# Patient Record
Sex: FEMALE | Race: WHITE | NOT HISPANIC OR LATINO
[De-identification: names, ages, dates, MRNs, and addresses within clinical notes are randomized per-mention and may not be internally consistent; named-entity substitution may affect disease eponyms.]

---

## 2018-11-28 ENCOUNTER — CHARTING TRANS (OUTPATIENT)
Dept: OTHER | Age: 63
End: 2018-11-28

## 2019-02-06 VITALS
DIASTOLIC BLOOD PRESSURE: 96 MMHG | HEART RATE: 104 BPM | RESPIRATION RATE: 18 BRPM | SYSTOLIC BLOOD PRESSURE: 120 MMHG | BODY MASS INDEX: 27.99 KG/M2 | WEIGHT: 152.12 LBS | HEIGHT: 62 IN | TEMPERATURE: 98.7 F

## 2019-09-18 ENCOUNTER — OFFICE VISIT (OUTPATIENT)
Dept: FAMILY MEDICINE CLINIC | Facility: CLINIC | Age: 64
End: 2019-09-18
Payer: MEDICARE

## 2019-09-18 ENCOUNTER — APPOINTMENT (OUTPATIENT)
Dept: LAB | Age: 64
End: 2019-09-18
Attending: FAMILY MEDICINE
Payer: MEDICARE

## 2019-09-18 VITALS
DIASTOLIC BLOOD PRESSURE: 89 MMHG | SYSTOLIC BLOOD PRESSURE: 154 MMHG | TEMPERATURE: 98 F | BODY MASS INDEX: 28.52 KG/M2 | HEART RATE: 91 BPM | HEIGHT: 61.81 IN | RESPIRATION RATE: 16 BRPM | WEIGHT: 155 LBS

## 2019-09-18 DIAGNOSIS — Z12.31 ENCOUNTER FOR SCREENING MAMMOGRAM FOR HIGH-RISK PATIENT: ICD-10-CM

## 2019-09-18 DIAGNOSIS — R03.0 ELEVATED BP WITHOUT DIAGNOSIS OF HYPERTENSION: ICD-10-CM

## 2019-09-18 DIAGNOSIS — Z00.00 ROUTINE PHYSICAL EXAMINATION: ICD-10-CM

## 2019-09-18 DIAGNOSIS — M25.50 ARTHRALGIA, UNSPECIFIED JOINT: Primary | ICD-10-CM

## 2019-09-18 DIAGNOSIS — R73.01 ELEVATED FASTING GLUCOSE: ICD-10-CM

## 2019-09-18 DIAGNOSIS — Z86.69 HISTORY OF MACULAR DEGENERATION: ICD-10-CM

## 2019-09-18 PROCEDURE — 99203 OFFICE O/P NEW LOW 30 MIN: CPT | Performed by: FAMILY MEDICINE

## 2019-09-18 NOTE — PROGRESS NOTES
HPI: Kale Fung is a 71year old female who presents for establishment of care. Has not been seen in 4-5 years. Has not seen another doctor. Pt reports she gets cough every once in awhile. Very active. She does have joint pain at the end of the day. Retired.

## 2019-09-24 DIAGNOSIS — E78.5 HYPERLIPIDEMIA, UNSPECIFIED HYPERLIPIDEMIA TYPE: Primary | ICD-10-CM

## 2019-09-30 ENCOUNTER — HOSPITAL ENCOUNTER (OUTPATIENT)
Dept: MAMMOGRAPHY | Age: 64
Discharge: HOME | End: 2019-09-30
Attending: FAMILY MEDICINE
Payer: MEDICARE

## 2019-09-30 DIAGNOSIS — Z12.31 ENCOUNTER FOR SCREENING MAMMOGRAM FOR HIGH-RISK PATIENT: ICD-10-CM

## 2019-09-30 PROCEDURE — 77067 SCR MAMMO BI INCL CAD: CPT | Performed by: FAMILY MEDICINE

## 2019-09-30 PROCEDURE — 77063 BREAST TOMOSYNTHESIS BI: CPT | Performed by: FAMILY MEDICINE

## 2019-10-01 DIAGNOSIS — R92.2 DENSE BREAST TISSUE ON MAMMOGRAM: Primary | ICD-10-CM

## 2019-11-07 ENCOUNTER — HOSPITAL ENCOUNTER (OUTPATIENT)
Dept: ULTRASOUND IMAGING | Age: 64
Discharge: HOME | End: 2019-11-07
Attending: FAMILY MEDICINE
Payer: MEDICARE

## 2019-11-07 DIAGNOSIS — R92.2 DENSE BREAST TISSUE ON MAMMOGRAM: ICD-10-CM

## 2019-11-07 PROCEDURE — 76641 ULTRASOUND BREAST COMPLETE: CPT | Performed by: FAMILY MEDICINE

## 2019-11-08 DIAGNOSIS — R92.8 ABNORMAL ULTRASOUND OF BREAST: Primary | ICD-10-CM

## 2019-11-13 ENCOUNTER — TELEPHONE (OUTPATIENT)
Dept: OTHER | Age: 64
End: 2019-11-13

## 2019-11-13 NOTE — PROGRESS NOTES
Patient calling back (verified name and ), advised Dr Watson Peer note and verbalized understanding. States that she still has the [employer] number .     Notes recorded by Pranav Grossman DO on 2019 at 3:47 PM CST  It is. [facility]

## 2019-11-19 ENCOUNTER — HOSPITAL ENCOUNTER (OUTPATIENT)
Dept: ULTRASOUND IMAGING | Facility: HOSPITAL | Age: 64
Discharge: HOME | End: 2019-11-19
Attending: FAMILY MEDICINE
Payer: MEDICARE

## 2019-11-19 DIAGNOSIS — R92.8 ABNORMAL ULTRASOUND OF BREAST: ICD-10-CM

## 2019-11-19 PROCEDURE — 76642 ULTRASOUND BREAST LIMITED: CPT | Performed by: FAMILY MEDICINE

## 2020-01-28 ENCOUNTER — TELEPHONE (OUTPATIENT)
Dept: PODIATRY CLINIC | Facility: CLINIC | Age: 65
End: 2020-01-28

## 2020-02-26 ENCOUNTER — TELEPHONE (OUTPATIENT)
Dept: FAMILY MEDICINE CLINIC | Facility: CLINIC | Age: 65
End: 2020-02-26

## 2020-07-22 ENCOUNTER — TELEPHONE (OUTPATIENT)
Dept: CASE MANAGEMENT | Age: 65
End: 2020-07-22

## 2020-07-22 NOTE — TELEPHONE ENCOUNTER
Patient is eligible for a 2020 Medicare Advantage Supervisit. Left message to call back [phone].

## 2020-08-24 ENCOUNTER — TELEPHONE (OUTPATIENT)
Dept: CASE MANAGEMENT | Age: 65
End: 2020-08-24

## 2020-08-24 NOTE — TELEPHONE ENCOUNTER
Patient is eligible for a 2020 Medicare Advantage Supervisit. Left message to call back [phone].

## 2020-09-17 ENCOUNTER — TELEPHONE (OUTPATIENT)
Dept: CASE MANAGEMENT | Age: 65
End: 2020-09-17

## 2020-09-17 NOTE — TELEPHONE ENCOUNTER
Patient is eligible for a 2020 Medicare Annual Wellness visit. Discussed in detail w/patient. Appt scheduled for 11/14/20.

## 2020-11-04 ENCOUNTER — OFFICE VISIT (OUTPATIENT)
Dept: FAMILY MEDICINE CLINIC | Facility: CLINIC | Age: 65
End: 2020-11-04
Payer: MEDICARE

## 2020-11-04 VITALS
BODY MASS INDEX: 27.42 KG/M2 | HEART RATE: 88 BPM | TEMPERATURE: 98 F | HEIGHT: 61.81 IN | RESPIRATION RATE: 16 BRPM | DIASTOLIC BLOOD PRESSURE: 80 MMHG | SYSTOLIC BLOOD PRESSURE: 128 MMHG | WEIGHT: 149 LBS

## 2020-11-04 DIAGNOSIS — M79.672 CHRONIC HEEL PAIN, LEFT: ICD-10-CM

## 2020-11-04 DIAGNOSIS — E28.39 ESTROGEN DEFICIENCY: ICD-10-CM

## 2020-11-04 DIAGNOSIS — Z00.00 ENCOUNTER FOR ANNUAL HEALTH EXAMINATION: ICD-10-CM

## 2020-11-04 DIAGNOSIS — G89.29 CHRONIC HEEL PAIN, LEFT: ICD-10-CM

## 2020-11-04 DIAGNOSIS — H35.30 MACULAR DEGENERATION OF BOTH EYES, UNSPECIFIED TYPE: ICD-10-CM

## 2020-11-04 DIAGNOSIS — Z80.0 FAMILY HISTORY OF COLON CANCER: Primary | ICD-10-CM

## 2020-11-04 DIAGNOSIS — Z00.00 ENCOUNTER FOR SUBSEQUENT ANNUAL WELLNESS VISIT IN MEDICARE PATIENT: ICD-10-CM

## 2020-11-04 PROCEDURE — 99397 PER PM REEVAL EST PAT 65+ YR: CPT | Performed by: FAMILY MEDICINE

## 2020-11-04 PROCEDURE — 3079F DIAST BP 80-89 MM HG: CPT | Performed by: FAMILY MEDICINE

## 2020-11-04 PROCEDURE — 96160 PT-FOCUSED HLTH RISK ASSMT: CPT | Performed by: FAMILY MEDICINE

## 2020-11-04 PROCEDURE — 3074F SYST BP LT 130 MM HG: CPT | Performed by: FAMILY MEDICINE

## 2020-11-04 PROCEDURE — G0438 PPPS, INITIAL VISIT: HCPCS | Performed by: FAMILY MEDICINE

## 2020-11-04 PROCEDURE — 3008F BODY MASS INDEX DOCD: CPT | Performed by: FAMILY MEDICINE

## 2020-11-04 NOTE — PROGRESS NOTES
HPI:   Charan Diana is a 76year old female who presents for a Medicare Subsequent Annual Wellness visit (Pt already had Initial Annual Wellness). 76 yr old female who presents for Medicare physical. Retired. Worked for [employer].  for [facility] on file in [place].    Advance care planning including the explanation and discussion of advance directives standard forms performed Face to Face with patient and Family/surrogate (if present), and forms available to patient in AVS alcohol use. No history on file for drug.      REVIEW OF SYSTEMS:     ROS:   Allergic/Immuno:  Negative for environmental allergies and food allergies  Cardiovascular:  Negative for chest pain and irregular heartbeat/palpitations  Constitutional:  Negative No   I especially have trouble understanding the speech of women and children: No I have trouble understanding the speaker in a large room such as at a meeting or place of Buddhism: No   Many people I talk to seem to mumble (or don't speak clearly): No Peop scan ordered. Chronic heel pain, left    Suspect plantar fasciitis. Exercises given. Advised to wear more supportive shoes. Macular degeneration of both eyes, unspecified type      Follows with Optometry.      Encounter for annual health examinati Occult Blood Annually No results found for: FOB No flowsheet data found. Glaucoma Screening      Ophthalmology Visit Annually: Diabetics, FHx Glaucoma, AA>50, > 65 No flowsheet data found.     Bone Density Screening      Dexascan Every two years

## 2020-11-24 NOTE — PATIENT INSTRUCTIONS
Lore Elliott's SCREENING SCHEDULE   Tests on this list are recommended by your physician but may not be covered, or covered at this frequency, by your insurer. Please check with your insurance carrier before scheduling to verify coverage.    PREVENT one of the following criteria:   • Men who are 73-68 years old and have smoked more than 100 cigarettes in their lifetime   • Anyone with a family history    Colorectal Cancer Screening  Covered up to Age 76     Colonoscopy Screen   Covered every 10 years- for this or any previous visit. Please get every year    Pneumococcal 13 (Prevnar)  Covered Once after 65 No orders found for this or any previous visit.  Please get once after your 65th birthday    Pneumococcal 23 (Pneumovax)  Covered Once after 65 No orde

## 2021-01-28 ENCOUNTER — TELEPHONE (OUTPATIENT)
Dept: FAMILY MEDICINE CLINIC | Facility: CLINIC | Age: 66
End: 2021-01-28

## 2021-05-27 ENCOUNTER — TELEPHONE (OUTPATIENT)
Dept: FAMILY MEDICINE CLINIC | Facility: CLINIC | Age: 66
End: 2021-05-27

## 2021-05-27 DIAGNOSIS — Z12.31 ENCOUNTER FOR MAMMOGRAM TO ESTABLISH BASELINE MAMMOGRAM: Primary | ICD-10-CM

## 2021-09-30 ENCOUNTER — TELEPHONE (OUTPATIENT)
Dept: FAMILY MEDICINE CLINIC | Facility: CLINIC | Age: 66
End: 2021-09-30

## 2021-12-21 ENCOUNTER — LAB ENCOUNTER (OUTPATIENT)
Dept: LAB | Age: 66
End: 2021-12-21
Attending: FAMILY MEDICINE
Payer: MEDICARE

## 2021-12-21 ENCOUNTER — OFFICE VISIT (OUTPATIENT)
Dept: FAMILY MEDICINE CLINIC | Facility: CLINIC | Age: 66
End: 2021-12-21
Payer: MEDICARE

## 2021-12-21 VITALS
TEMPERATURE: 98 F | DIASTOLIC BLOOD PRESSURE: 89 MMHG | WEIGHT: 149.63 LBS | BODY MASS INDEX: 27.89 KG/M2 | HEART RATE: 87 BPM | SYSTOLIC BLOOD PRESSURE: 156 MMHG | RESPIRATION RATE: 16 BRPM | HEIGHT: 61.5 IN

## 2021-12-21 DIAGNOSIS — M79.641 RIGHT HAND PAIN: ICD-10-CM

## 2021-12-21 DIAGNOSIS — Z00.00 ENCOUNTER FOR SUBSEQUENT ANNUAL WELLNESS VISIT IN MEDICARE PATIENT: Primary | ICD-10-CM

## 2021-12-21 DIAGNOSIS — E28.39 ESTROGEN DEFICIENCY: ICD-10-CM

## 2021-12-21 PROCEDURE — 3008F BODY MASS INDEX DOCD: CPT | Performed by: FAMILY MEDICINE

## 2021-12-21 PROCEDURE — 99397 PER PM REEVAL EST PAT 65+ YR: CPT | Performed by: FAMILY MEDICINE

## 2021-12-21 PROCEDURE — 3077F SYST BP >= 140 MM HG: CPT | Performed by: FAMILY MEDICINE

## 2021-12-21 PROCEDURE — 3079F DIAST BP 80-89 MM HG: CPT | Performed by: FAMILY MEDICINE

## 2021-12-21 PROCEDURE — 96160 PT-FOCUSED HLTH RISK ASSMT: CPT | Performed by: FAMILY MEDICINE

## 2021-12-21 PROCEDURE — G0439 PPPS, SUBSEQ VISIT: HCPCS | Performed by: FAMILY MEDICINE

## 2021-12-21 NOTE — PROGRESS NOTES
HPI:   Jacinto Woodard is a 78year old female who presents for a Medicare Subsequent Annual Wellness visit (Pt already had Initial Annual Wellness). 78 yr old female who presents for physical. Retired. Lives with 2 sisters and other family.  Breanne Family/surrogate (if present), and forms available to patient in AVS          She has never smoked tobacco.    CAGE screening score of 0 on 12/21/2021, showing low risk of alcohol abuse.         Patient Care Team: Patient Care Team:  Neo Pendleton DO a heartbeat/palpitations  Constitutional:  Negative for decreased activity, fever, irritability and lethargy  Endocrine:  Negative for abnormal sleep patterns, increased activity, polydipsia and polyphagia  ENMT:  Negative for ear drainage, hearing loss and bilateral.  No nipple discharge noted. Normal lymph nodes. Extremities: No cyanosis, clubbing, edema. Pedal pulses 2+ ema. MSK:  No abnormalities. Skin: Warm/dry/intact. No abnormal moles/lesions noted. No rashes.   Psych: Orientated to person, place scheduling to verify coverage.    PREVENTATIVE SERVICES FREQUENCY &  COVERAGE DETAILS LAST COMPLETION DATE   Diabetes Screening    Fasting Blood Sugar /  Glucose    One screening every 12 months if never tested or if previously tested but not diagnosed with Screening Mammogram    Mammogram     Recommend annually for all female patients aged 36 and older    One baseline mammogram covered for patients aged 32-38 09/30/2019    Mammogram due on 09/30/2020    Immunizations    Influenza Covered once per flu seaso

## 2021-12-22 NOTE — PATIENT INSTRUCTIONS
Denys Elliott's SCREENING SCHEDULE   Tests on this list are recommended by your physician but may not be covered, or covered at this frequency, by your insurer. Please check with your insurance carrier before scheduling to verify coverage.    Shelly Gan done   Pap and Pelvic    Pap   Covered every 2 years for women at normal risk;  Annually if at high risk -  No recommendations at this time    Chlamydia Annually if high risk -  No recommendations at this time   Screening Mammogram    Mammogram     Recommen

## 2022-01-02 ENCOUNTER — HOSPITAL ENCOUNTER (OUTPATIENT)
Dept: BONE DENSITY | Facility: HOSPITAL | Age: 67
Discharge: HOME | End: 2022-01-02
Attending: FAMILY MEDICINE
Payer: MEDICARE

## 2022-01-02 DIAGNOSIS — E28.39 ESTROGEN DEFICIENCY: ICD-10-CM

## 2022-01-02 PROCEDURE — 77080 DXA BONE DENSITY AXIAL: CPT | Performed by: FAMILY MEDICINE

## 2022-01-03 ENCOUNTER — HOSPITAL ENCOUNTER (OUTPATIENT)
Dept: GENERAL RADIOLOGY | Age: 67
Discharge: HOME | End: 2022-01-03
Attending: FAMILY MEDICINE
Payer: MEDICARE

## 2022-01-03 DIAGNOSIS — M79.641 RIGHT HAND PAIN: ICD-10-CM

## 2022-01-03 PROCEDURE — 73130 X-RAY EXAM OF HAND: CPT | Performed by: FAMILY MEDICINE

## 2022-01-05 ENCOUNTER — PATIENT MESSAGE (OUTPATIENT)
Dept: FAMILY MEDICINE CLINIC | Facility: CLINIC | Age: 67
End: 2022-01-05

## 2022-01-05 DIAGNOSIS — M79.641 RIGHT HAND PAIN: ICD-10-CM

## 2022-01-05 DIAGNOSIS — E78.5 HYPERLIPIDEMIA, UNSPECIFIED HYPERLIPIDEMIA TYPE: ICD-10-CM

## 2022-01-05 DIAGNOSIS — M81.0 OSTEOPOROSIS, UNSPECIFIED OSTEOPOROSIS TYPE, UNSPECIFIED PATHOLOGICAL FRACTURE PRESENCE: Primary | ICD-10-CM

## 2022-01-05 RX ORDER — ATORVASTATIN CALCIUM 10 MG/1
10 TABLET, FILM COATED ORAL NIGHTLY
Qty: 90 TABLET | Refills: 1 | Status: SHIPPED | OUTPATIENT
Start: 2022-01-05

## 2022-01-05 NOTE — TELEPHONE ENCOUNTER
Hand xray    CONCLUSION:    Osteoarthritis throughout the right hand, moderate at the triscaphe joint.       No acute fracture, dislocation osseous erosion.       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

## 2022-01-05 NOTE — TELEPHONE ENCOUNTER
From: Humera Iqbal  To: Zee Jacksno DO  Sent: 1/5/2022 4:17 PM CST  Subject: Question regarding X-Ray Hand    What is the results I don't understand can I take something for the swelling and pain  Thank you

## 2022-01-07 NOTE — TELEPHONE ENCOUNTER
Patient reports she has been taking ibuprofen BID since appointment on 12/21/21 but pain and swelling is persistent. Do you have any further recommendations for patient?

## 2022-01-09 RX ORDER — METHYLPREDNISOLONE 4 MG/1
TABLET ORAL
Qty: 1 EACH | Refills: 0 | Status: SHIPPED | OUTPATIENT
Start: 2022-01-09

## 2022-01-10 NOTE — TELEPHONE ENCOUNTER
Patient informed of message below. Patient has no idea why she is seeing a specialist and states she never received her test results for her Xray.     Please advise-

## 2022-01-20 NOTE — TELEPHONE ENCOUNTER
Spoke with patient. Much improved with Medrol dose pack.   Will monitor and will see specialist if pain level worsens

## 2022-02-03 ENCOUNTER — TELEPHONE (OUTPATIENT)
Dept: FAMILY MEDICINE CLINIC | Facility: CLINIC | Age: 67
End: 2022-02-03

## 2022-03-07 ENCOUNTER — TELEPHONE (OUTPATIENT)
Dept: FAMILY MEDICINE CLINIC | Facility: CLINIC | Age: 67
End: 2022-03-07

## 2022-03-07 NOTE — TELEPHONE ENCOUNTER
Patient would like to schedule later in the year, Last MA was just in December 2021 and would prefer to schedule closer to Nov/Dec.

## 2022-04-18 ENCOUNTER — TELEPHONE (OUTPATIENT)
Dept: FAMILY MEDICINE CLINIC | Facility: CLINIC | Age: 67
End: 2022-04-18

## 2022-04-18 NOTE — TELEPHONE ENCOUNTER
It was documented in the visit from December that patient had a Cologuard box at home that she was going to do. Can you please call her to remind her to send that sample and if she has not. If she has and have the results, can you please have her send us or fax us the results so we can update health maintenance.

## 2022-04-30 ENCOUNTER — TELEPHONE (OUTPATIENT)
Dept: FAMILY MEDICINE CLINIC | Facility: CLINIC | Age: 67
End: 2022-04-30

## 2022-07-06 RX ORDER — ATORVASTATIN CALCIUM 10 MG/1
TABLET, FILM COATED ORAL
Qty: 90 TABLET | Refills: 1 | Status: SHIPPED | OUTPATIENT
Start: 2022-07-06

## 2022-08-22 ENCOUNTER — TELEPHONE (OUTPATIENT)
Dept: CASE MANAGEMENT | Age: 67
End: 2022-08-22

## 2022-08-22 NOTE — TELEPHONE ENCOUNTER
Patient is eligible for a 2022 Medicare Annual Wellness visit. This visit can be scheduled any time in the calendar year. Discussed in detail w/patient. Appt scheduled for 11/2/22.

## 2022-11-02 ENCOUNTER — OFFICE VISIT (OUTPATIENT)
Dept: FAMILY MEDICINE CLINIC | Facility: CLINIC | Age: 67
End: 2022-11-02
Payer: MEDICARE

## 2022-11-02 ENCOUNTER — LAB ENCOUNTER (OUTPATIENT)
Dept: LAB | Age: 67
End: 2022-11-02
Attending: FAMILY MEDICINE
Payer: MEDICARE

## 2022-11-02 VITALS
WEIGHT: 146 LBS | HEART RATE: 88 BPM | DIASTOLIC BLOOD PRESSURE: 84 MMHG | SYSTOLIC BLOOD PRESSURE: 132 MMHG | RESPIRATION RATE: 16 BRPM | HEIGHT: 61.42 IN | BODY MASS INDEX: 27.21 KG/M2 | TEMPERATURE: 98 F

## 2022-11-02 DIAGNOSIS — Z12.11 SCREENING FOR COLON CANCER: ICD-10-CM

## 2022-11-02 DIAGNOSIS — R03.0 ELEVATED BLOOD PRESSURE READING: ICD-10-CM

## 2022-11-02 DIAGNOSIS — M79.642 BILATERAL HAND PAIN: ICD-10-CM

## 2022-11-02 DIAGNOSIS — Z00.00 ENCOUNTER FOR SUBSEQUENT ANNUAL WELLNESS VISIT IN MEDICARE PATIENT: Primary | ICD-10-CM

## 2022-11-02 DIAGNOSIS — Z00.00 ENCOUNTER FOR ANNUAL HEALTH EXAMINATION: ICD-10-CM

## 2022-11-02 DIAGNOSIS — M79.641 BILATERAL HAND PAIN: ICD-10-CM

## 2022-11-03 LAB
ALBUMIN/GLOBULIN RATIO: 1.7 (CALC) (ref 1–2.5)
ALBUMIN: 4.5 G/DL (ref 3.6–5.1)
ALKALINE PHOSPHATASE: 61 U/L (ref 37–153)
ALT: 20 U/L (ref 6–29)
AST: 21 U/L (ref 10–35)
BILIRUBIN, DIRECT: 0.1 MG/DL
BILIRUBIN, INDIRECT: 0.3 MG/DL (CALC) (ref 0.2–1.2)
BILIRUBIN, TOTAL: 0.4 MG/DL (ref 0.2–1.2)
CHOL/HDLC RATIO: 3 (CALC)
CHOLESTEROL, TOTAL: 143 MG/DL
GLOBULIN: 2.7 G/DL (CALC) (ref 1.9–3.7)
HDL CHOLESTEROL: 47 MG/DL
LDL-CHOLESTEROL: 79 MG/DL (CALC)
NON-HDL CHOLESTEROL: 96 MG/DL (CALC)
PROTEIN, TOTAL: 7.2 G/DL (ref 6.1–8.1)
TRIGLYCERIDES: 88 MG/DL

## 2022-12-30 RX ORDER — ATORVASTATIN CALCIUM 10 MG/1
10 TABLET, FILM COATED ORAL NIGHTLY
Qty: 90 TABLET | Refills: 1 | Status: SHIPPED | OUTPATIENT
Start: 2022-12-30

## 2022-12-30 NOTE — TELEPHONE ENCOUNTER
Refill passed per [facility] protocol.      Requested Prescriptions   Pending Prescriptions Disp Refills    ATORVASTATIN 10 MG Oral Tab [Pharmacy Med Name: ATORVASTATIN 10MG TABLETS] 90 tablet 1     Sig: TAKE 1 TABLET(10 MG) BY MOUTH EVERY NIGHT       Cholesterol Medication Protocol Passed - 12/30/2022  3:11 PM        Passed - ALT in past 12 months        Passed - LDL in past 12 months        Passed - Last ALT < 80     Lab Results   Component Value Date    ALT 20 11/02/2022             Passed - Last LDL < 130     Lab Results   Component Value Date    LDL 79 11/02/2022             Passed - In person appointment or virtual visit in the past 12 mos or appointment in next 3 mos     Recent Outpatient Visits              1 month ago Encounter for subsequent annual wellness visit in Medicare patient    [facility], [address], Ogla Buckley, [address] Visit    1 year ago Encounter for subsequent annual wellness visit in Medicare patient [facility], [address], Olga Buckley, DO    Office Visit    2 years ago Family history of colon cancer    [facility], [address], Olga Buckley, DO    Office Visit    3 years ago Arthralgia, unspecified joint    [facility], [address], Olga Buckley [place]    Office Visit    7 years ago Elbow pain, left    [address], Trace Monroy, DO    Office Visit                                 Recent Outpatient Visits              1 month ago Encounter for subsequent annual wellness visit in Medicare patient [facility], [address], Olga Buckley,     Office Visit    1 year ago Encounter for subsequent annual wellness visit in Medicare patient    [facility], [address], Olga Buckley, DO    Office Visit    2 years ago Family history of colon cancer    [facility], [address], [facility]    Office Visit    3 years ago Arthralgia, unspecified joint    [address] [place], [place]    Office Visit    7 years ago Elbow pain, left    [address], [address], DO    Office Visit

## 2023-03-20 ENCOUNTER — TELEPHONE (OUTPATIENT)
Dept: FAMILY MEDICINE CLINIC | Facility: CLINIC | Age: 68
End: 2023-03-20

## 2023-05-17 ENCOUNTER — TELEPHONE (OUTPATIENT)
Dept: FAMILY MEDICINE CLINIC | Facility: CLINIC | Age: 68
End: 2023-05-17

## 2023-06-20 ENCOUNTER — TELEPHONE (OUTPATIENT)
Dept: FAMILY MEDICINE CLINIC | Facility: CLINIC | Age: 68
End: 2023-06-20

## 2023-11-08 ENCOUNTER — OFFICE VISIT (OUTPATIENT)
Dept: FAMILY MEDICINE CLINIC | Facility: CLINIC | Age: 68
End: 2023-11-08

## 2023-11-08 ENCOUNTER — LAB ENCOUNTER (OUTPATIENT)
Dept: LAB | Age: 68
End: 2023-11-08
Attending: FAMILY MEDICINE
Payer: MEDICARE

## 2023-11-08 VITALS
SYSTOLIC BLOOD PRESSURE: 128 MMHG | DIASTOLIC BLOOD PRESSURE: 80 MMHG | TEMPERATURE: 98 F | BODY MASS INDEX: 27.92 KG/M2 | RESPIRATION RATE: 16 BRPM | HEIGHT: 61.5 IN | WEIGHT: 149.81 LBS | HEART RATE: 102 BPM

## 2023-11-08 DIAGNOSIS — Z00.00 ENCOUNTER FOR SUBSEQUENT ANNUAL WELLNESS VISIT IN MEDICARE PATIENT: Primary | ICD-10-CM

## 2023-11-08 DIAGNOSIS — M65.331 TRIGGER MIDDLE FINGER OF RIGHT HAND: ICD-10-CM

## 2023-11-08 DIAGNOSIS — G62.9 NEUROPATHY: ICD-10-CM

## 2023-11-08 DIAGNOSIS — E78.5 HYPERLIPIDEMIA, UNSPECIFIED HYPERLIPIDEMIA TYPE: ICD-10-CM

## 2023-11-08 DIAGNOSIS — M81.0 OSTEOPOROSIS, UNSPECIFIED OSTEOPOROSIS TYPE, UNSPECIFIED PATHOLOGICAL FRACTURE PRESENCE: ICD-10-CM

## 2023-11-08 DIAGNOSIS — M19.90 ARTHRITIS: ICD-10-CM

## 2023-11-08 RX ORDER — NAPROXEN 500 MG/1
500 TABLET ORAL 2 TIMES DAILY PRN
Qty: 60 TABLET | Refills: 3 | Status: SHIPPED | OUTPATIENT
Start: 2023-11-08

## 2023-11-09 LAB
ALBUMIN/GLOBULIN RATIO: 1.5 (CALC) (ref 1–2.5)
ALBUMIN: 4.8 G/DL (ref 3.6–5.1)
ALKALINE PHOSPHATASE: 78 U/L (ref 37–153)
ALT: 19 U/L (ref 6–29)
AST: 17 U/L (ref 10–35)
BILIRUBIN, TOTAL: 0.4 MG/DL (ref 0.2–1.2)
BUN: 18 MG/DL (ref 7–25)
CALCIUM: 9.9 MG/DL (ref 8.6–10.4)
CARBON DIOXIDE: 25 MMOL/L (ref 20–32)
CHLORIDE: 104 MMOL/L (ref 98–110)
CHOL/HDLC RATIO: 3.5 (CALC)
CHOLESTEROL, TOTAL: 167 MG/DL
CREATININE: 0.81 MG/DL (ref 0.5–1.05)
EGFR: 79 ML/MIN/1.73M2
GLOBULIN: 3.2 G/DL (CALC) (ref 1.9–3.7)
GLUCOSE: 75 MG/DL (ref 65–99)
HDL CHOLESTEROL: 48 MG/DL
HEMATOCRIT: 45.3 % (ref 35–45)
HEMOGLOBIN: 15 G/DL (ref 11.7–15.5)
LDL-CHOLESTEROL: 89 MG/DL (CALC)
MCH: 30.1 PG (ref 27–33)
MCHC: 33.1 G/DL (ref 32–36)
MCV: 90.8 FL (ref 80–100)
MPV: 10.7 FL (ref 7.5–12.5)
NON-HDL CHOLESTEROL: 119 MG/DL (CALC)
PLATELET COUNT: 243 THOUSAND/UL (ref 140–400)
POTASSIUM: 4.2 MMOL/L (ref 3.5–5.3)
PROTEIN, TOTAL: 8 G/DL (ref 6.1–8.1)
RDW: 12.4 % (ref 11–15)
RED BLOOD CELL COUNT: 4.99 MILLION/UL (ref 3.8–5.1)
SODIUM: 140 MMOL/L (ref 135–146)
TRIGLYCERIDES: 201 MG/DL
TSH: 1.15 MIU/L (ref 0.4–4.5)
VITAMIN B12: 433 PG/ML (ref 200–1100)
WHITE BLOOD CELL COUNT: 5.5 THOUSAND/UL (ref 3.8–10.8)

## 2023-11-11 PROBLEM — E78.5 HYPERLIPIDEMIA: Status: ACTIVE | Noted: 2023-11-11

## 2023-11-11 PROBLEM — M81.0 OSTEOPOROSIS: Status: ACTIVE | Noted: 2023-11-11

## 2023-12-24 RX ORDER — ATORVASTATIN CALCIUM 10 MG/1
10 TABLET, FILM COATED ORAL NIGHTLY
Qty: 90 TABLET | Refills: 3 | Status: SHIPPED | OUTPATIENT
Start: 2023-12-24

## 2023-12-24 NOTE — TELEPHONE ENCOUNTER
Refill Passed per Protocol.     Requested Prescriptions   Pending Prescriptions Disp Refills    ATORVASTATIN 10 MG Oral Tab [Pharmacy Med Name: ATORVASTATIN 10MG TABLETS] 90 tablet 1     Sig: TAKE 1 TABLET(10 MG) BY MOUTH EVERY NIGHT       Cholesterol Medication Protocol Passed - 12/22/2023  2:26 PM        Passed - ALT in past 12 months        Passed - LDL in past 12 months        Passed - Last ALT < 80     Lab Results   Component Value Date    ALT 19 11/08/2023             Passed - Last LDL < 130     Lab Results   Component Value Date    LDL 89 11/08/2023             Passed - In person appointment or virtual visit in the past 12 mos or appointment in next 3 mos     Recent Outpatient Visits              1 month ago Encounter for subsequent annual wellness visit in Medicare patient    [facility] [place], Colleen M, DO    Office Visit    1 year ago Encounter for subsequent annual wellness visit in Medicare patient    [facility], [facility]    Office Visit    2 years ago Encounter for subsequent annual wellness visit in Medicare patient    [facility] Colleen M, DO    Office Visit    3 years ago Family history of colon cancer    [facility], Colleen M, DO    Office Visit    4 years ago Arthralgia, unspecified joint    [facility] Colleen M, DO    Office Visit                              Recent Outpatient Visits              1 month ago Encounter for subsequent annual wellness visit in Medicare patient    [facility] Colleen M, DO    Office Visit    1 year ago Encounter for subsequent annual wellness visit in Medicare patient    [facility] Colleen M, DO    Office Visit    2 years ago  Encounter for subsequent annual wellness visit in Medicare patient    [facility] [address], [place], Colleen M, DO    Office Visit    3 years ago [facility] history of colon cancer    [facility] [address], [place], Colleen M, DO    Office Visit    4 years ago Arthralgia, unspecified joint    [facility] [address], [place], Colleen M, DO    Office Visit

## 2024-02-27 RX ORDER — NAPROXEN 500 MG/1
500 TABLET ORAL 2 TIMES DAILY PRN
Qty: 60 TABLET | Refills: 3 | Status: SHIPPED | OUTPATIENT
Start: 2024-02-27

## 2024-02-27 NOTE — TELEPHONE ENCOUNTER
Refill passed per [facility] protocol.  Requested Prescriptions   Pending Prescriptions Disp Refills    NAPROXEN 500 MG Oral Tab [Pharmacy Med Name: NAPROXEN 500MG TABLETS] 60 tablet 3     Sig: TAKE 1 TABLET(500 MG) BY MOUTH TWICE DAILY AS NEEDED       Non-Narcotic Pain Medication Protocol Passed - 2/26/2024  3:11 PM        Passed - In person appointment or virtual visit in the past 6 mos or appointment in next 3 mos     Recent Outpatient Visits              3 months ago Encounter for subsequent annual wellness visit in Medicare patient    [facility], [facility] [place], Colleen M, DO    Office Visit    1 year ago Encounter for subsequent annual wellness visit in Medicare patient    [facility], [facility], [place] We[place], Colleen M, DO    Office Visit    2 years ago Encounter for subsequent annual wellness visit in Medicare patient    [facility], [facility] [place] We[place], Colleen M, DO    Office Visit    3 years ago Family history of colon cancer    [facility] [place] Col[place] M, DO    Office Visit    4 years ago Arthralgia, unspecified joint    [facility] [place] [place], Colleen M, DO    Office Visit                         Recent Outpatient Visits              3 months ago Encounter for subsequent annual wellness visit in Medicare patient    [facility], [facility], [place] We[place], Colleen M, DO    Office Visit    1 year ago Encounter for subsequent annual wellness visit in Medicare patient    [facility], [facility], [place] Weiler, Colleen M, DO    Office Visit    2 years ago Encounter for subsequent annual wellness visit in Medicare patient    [facility], [facility] [place], Colleen M, DO    Office Visit    3 years ago Family history of colon cancer    [facility]  [place], [place], Colleen M, DO    Office Visit    4 years ago Arthralgia, unspecified joint    [facility], [facility], [place], Colleen M, DO    Office Visit

## 2024-04-01 ENCOUNTER — NURSE TRIAGE (OUTPATIENT)
Dept: FAMILY MEDICINE CLINIC | Facility: CLINIC | Age: 69
End: 2024-04-01

## 2024-04-01 NOTE — TELEPHONE ENCOUNTER
Action Requested: Summary for Provider     []  Critical Lab, Recommendations Needed  [] Need Additional Advice  [x]   FYI    []   Need Orders  [] Need Medications Sent to Pharmacy  []  Other     SUMMARY: Sister Adriana(on HIPAA) indicated that that for the past 2 days patient has had a red rash on her left lower leg. Red spots with white center about 5 on the left leg today, yesterday only had 2 spots. Itchy. Not painful.  Today also one spot on the right lower leg and the spots are bigger today. Alittle bigger than a dime. No fevers. No cold symptoms. No other symptoms. Patient wanted to be seen in [place].  No appointments available today or tomorrow at [facility]. Appointment made for tomorrow at 1:30pm with Dr Perry in [place].   Advised sister that if symptoms worse to go to urgent care in [place]. Sister agreed.   Reason for call: Rash  Onset: Mar 31, 2024  Reason for Disposition   Patient wants to be seen    Protocols used: Rash or Redness - [id number]

## 2024-04-02 ENCOUNTER — OFFICE VISIT (OUTPATIENT)
Facility: CLINIC | Age: 69
End: 2024-04-02
Payer: MEDICARE

## 2024-04-02 VITALS
SYSTOLIC BLOOD PRESSURE: 130 MMHG | DIASTOLIC BLOOD PRESSURE: 84 MMHG | BODY MASS INDEX: 28.7 KG/M2 | OXYGEN SATURATION: 98 % | HEART RATE: 108 BPM | HEIGHT: 61.5 IN | WEIGHT: 154 LBS

## 2024-04-02 DIAGNOSIS — L28.2 PRURITIC RASH: Primary | ICD-10-CM

## 2024-04-02 DIAGNOSIS — R03.0 ELEVATED BLOOD PRESSURE READING WITHOUT DIAGNOSIS OF HYPERTENSION: ICD-10-CM

## 2024-04-02 PROCEDURE — 99213 OFFICE O/P EST LOW 20 MIN: CPT | Performed by: FAMILY MEDICINE

## 2024-04-02 RX ORDER — TRIAMCINOLONE ACETONIDE 0.25 MG/G
1 OINTMENT TOPICAL 2 TIMES DAILY
Qty: 80 G | Refills: 1 | Status: SHIPPED | OUTPATIENT
Start: 2024-04-02 | End: 2024-04-16

## 2024-04-02 NOTE — PROGRESS NOTES
HPI:    Patient ID: Olga Lidia Elliott is a 69 year old female who presents for rash.    HPI  Rash started this past Saturday.   Has red spots on legs.   Only has spots on b/l legs. No rashes elsewhere.   Very itchy.   No pain.   No other associated symptoms.   No cold/flu symptoms.   No recent illnesses.   No recent travel.   Using peroxide and neosporin.   LAHW her two sisters, neither of whom have any rashes. No pets at home.   Denies any prior skin conditions. However, she had a rash on her chest decades ago that lasted for months. She saw multiple dermatologists and was never provided a diagnosis.     Past Medical History:   Diagnosis Date    Macular degeneration     [LATERALITY NOT STATED]    Migraines     migraine headches        Current Outpatient Medications   Medication Sig Dispense Refill    triamcinolone 0.025 % External Ointment Apply 1 Application topically 2 (two) times daily for 14 days. 80 g 1    naproxen 500 MG Oral Tab Take 1 tablet (500 mg total) by mouth 2 (two) times daily as needed. 60 tablet 3    atorvastatin 10 MG Oral Tab Take 1 tablet (10 mg total) by mouth nightly. 90 tablet 3        No Known Allergies    Review of Systems   Constitutional: Negative.    Respiratory: Negative.     Cardiovascular: Negative.    Gastrointestinal: Negative.    Skin:  Positive for rash.   Neurological: Negative.    All other systems reviewed and are negative.           /84   Pulse 108   Ht 5' 1.5\" (1.562 m)   Wt 154 lb (69.9 kg)   SpO2 98%   BMI 28.63 kg/m²     PHYSICAL EXAM:   Physical Exam  Constitutional:       General: She is not in acute distress.     Appearance: Normal appearance. She is well-developed. She is not ill-appearing, toxic-appearing or diaphoretic.   HENT:      Head: Normocephalic and atraumatic.      Right Ear: Tympanic membrane, ear canal and external ear normal.      Left Ear: Tympanic membrane, ear canal and external ear normal.      Nose: Nose normal.      Mouth/Throat:       Mouth: Mucous membranes are moist.      Pharynx: Oropharynx is clear.   Eyes:      Extraocular Movements: Extraocular movements intact.      Conjunctiva/sclera: Conjunctivae normal.   Cardiovascular:      Rate and Rhythm: Normal rate and regular rhythm.      Pulses: Normal pulses.      Heart sounds: Normal heart sounds. No murmur heard.     No friction rub. No gallop.   Pulmonary:      Effort: Pulmonary effort is normal. No respiratory distress.      Breath sounds: Normal breath sounds. No wheezing or rales.   Musculoskeletal:      Cervical back: Neck supple.      Right lower leg: No edema.      Left lower leg: No edema.   Lymphadenopathy:      Cervical: No cervical adenopathy.   Skin:     General: Skin is warm and dry.      Capillary Refill: Capillary refill takes less than 2 seconds.      Comments: Multiple similar appearing lesions on b/l legs. Has 5 lesions on right leg, and 1 lesion on left medial ankle. Lesions are round, flat, red, non-blanching, nontender, with a centralize papule or pustule. Largest lesion is on right medial lower leg and spans 2.5cm x 1.5cm.    Neurological:      General: No focal deficit present.      Mental Status: She is alert.   Psychiatric:         Mood and Affect: Mood normal.         Behavior: Behavior normal.         Thought Content: Thought content normal.         Judgment: Judgment normal.             ASSESSMENT/PLAN:     Encounter Diagnoses   Name Primary?    Pruritic rash Yes    Elevated blood pressure reading without diagnosis of hypertension        1. Pruritic rash  - Derm Referral - In Network  -Unknown etiology (?erythema multiforme, ?tinea). Recommend dermatology evaluation. Referral generated.   -Trial TAC ointment BID for up to 14 days in the meantime.   -Hold peroxide and neosporin for now.   -Urged to call with new/worsening symptoms.     2. Elevated blood pressure reading without diagnosis of hypertension  -Initially elevated but decreased upon repeat.     Meds This  Visit:  Requested Prescriptions     Signed Prescriptions Disp Refills    triamcinolone 0.025 % External Ointment 80 g 1     Sig: Apply 1 Application topically 2 (two) times daily for 14 days.       Imaging & Referrals:  DERM - INTERNAL       Anderson Perry,   ID#[id number]

## 2024-06-28 NOTE — TELEPHONE ENCOUNTER
Please review. Protocol Failed; No Protocol    Requested Prescriptions   Pending Prescriptions Disp Refills    NAPROXEN 500 MG Oral Tab [Pharmacy Med Name: NAPROXEN 500MG TABLETS] 60 tablet 3     Sig: TAKE 1 TABLET(500 MG) BY MOUTH TWICE DAILY AS NEEDED       Non-Narcotic Pain Medication Protocol Failed - 6/25/2024  5:36 PM        Failed - In person appointment or virtual visit in the past 6 mos or appointment in next 3 mos     Recent Outpatient Visits              2 months ago Pruritic rash    [facility],     Office Visit    7 months ago Encounter for subsequent annual wellness visit in Medicare patient    [facility] [place], Colleen M,     Office Visit    1 year ago Encounter for subsequent annual wellness visit in Medicare patient    [facility], [facility] [place], Colleen M,     Office Visit    2 years ago Encounter for subsequent annual wellness visit in Medicare patient    [facility], [facility] [place] We[place], Colleen M,     Office Visit    3 years ago Family history of colon cancer    [facility] [place], Colleen M, DO    Office Visit                               Recent Outpatient Visits              2 months ago Pruritic rash    [facility],     Office Visit    7 months ago Encounter for subsequent annual wellness visit in Medicare patient    [facility], [facility] [place] We[place], Colleen M,     Office Visit    1 year ago Encounter for subsequent annual wellness visit in Medicare patient    [facility], [facility] [place] We[place], Colleen M,     Office Visit    2 years ago Encounter for subsequent annual wellness visit in Medicare patient    [facility], [facility] [place] Weiler, Frances  MARYANNE, DO    Office Visit    3 years ago Family history of colon cancer    [facility], [facility] Weiler, Colleen M, DO    Office Visit

## 2024-07-01 RX ORDER — NAPROXEN 500 MG/1
500 TABLET ORAL 2 TIMES DAILY PRN
Qty: 60 TABLET | Refills: 3 | Status: SHIPPED | OUTPATIENT
Start: 2024-07-01

## 2024-07-05 RX ORDER — TRIAMCINOLONE ACETONIDE 0.25 MG/G
1 OINTMENT TOPICAL 2 TIMES DAILY
Qty: 80 G | Refills: 1 | Status: SHIPPED | OUTPATIENT
Start: 2024-07-05

## 2024-07-05 NOTE — TELEPHONE ENCOUNTER
Please review. Protocol Failed; No Protocol    Requested Prescriptions   Pending Prescriptions Disp Refills    TRIAMCINOLONE 0.025 % External Ointment [Pharmacy Med Name: TRIAMCINOLONE 0.025% OINTMENT 80GM] 80 g 1     Sig: APPLY TOPICALLY TO THE AFFECTED AREA TWICE DAILY FOR 14 DAYS       There is no refill protocol information for this order              Recent Outpatient Visits              3 months ago Pruritic rash    [facility], [facility] Anderson Prery, DO    Office Visit    8 months ago Encounter for subsequent annual wellness visit in Medicare patient    [facility], [facility] Weiler, Colleen M, DO    Office Visit    1 year ago Encounter for subsequent annual wellness visit in Medicare patient    [facility] [place], Colleen M, DO    Office Visit    2 years ago Encounter for subsequent annual wellness visit in Medicare patient    [facility] [place], Colleen M, DO    Office Visit    3 years ago Family history of colon cancer    [facility], [facility] [place], Colleen M, DO    Office Visit

## 2024-10-22 RX ORDER — NAPROXEN 500 MG/1
500 TABLET ORAL 2 TIMES DAILY PRN
Qty: 180 TABLET | Refills: 0 | Status: SHIPPED | OUTPATIENT
Start: 2024-10-22

## 2024-10-22 NOTE — TELEPHONE ENCOUNTER
Refill passed per [facility] protocol.  Requested Prescriptions   Pending Prescriptions Disp Refills    NAPROXEN 500 MG Oral Tab [Pharmacy Med Name: NAPROXEN 500MG TABLETS] 60 tablet 3     Sig: TAKE 1 TABLET(500 MG) BY MOUTH TWICE DAILY AS NEEDED       Non-Narcotic Pain Medication Protocol Passed - 10/22/2024  3:17 PM        Passed - In person appointment or virtual visit in the past 6 mos or appointment in next 3 mos     Recent Outpatient Visits              6 months ago Pruritic rash    [facility], [facility] Anderson Perry,     Office Visit    11 months ago Encounter for subsequent annual wellness visit in Medicare patient    [facility] Weiler, Colleen M, DO    Office Visit    1 year ago Encounter for subsequent annual wellness visit in Medicare patient    [facility] Weiler, Colleen M, DO    Office Visit    2 years ago Encounter for subsequent annual wellness visit in Medicare patient    [facility] Weiler, Colleen M, DO    Office Visit    3 years ago Family history of colon cancer    [facility] Weiler, Colleen M, DO    Office Visit          Future Appointments         Provider Department Appt Notes    In 1 month Weiler, Colleen M, DO [facility] px, last px: 11/08/2024, policy informed                       Recent Outpatient Visits              6 months ago Pruritic rash    [facility] Anderson Perry,     Office Visit    11 months ago Encounter for subsequent annual wellness visit in Medicare patient    [facility] Weiler, Colleen M,     Office Visit    1 year ago Encounter for subsequent annual wellness visit in Medicare patient    [facility], [facility]  Weiler, Colleen M, DO    Office Visit    2 years ago Encounter for subsequent annual wellness visit in Medicare patient    [facility], [facility] Weiler, Colleen M, DO    Office Visit    3 years ago Family history of colon cancer    [facility], [facility] Weiler, Colleen M, DO    Office Visit          Future Appointments         Provider Department Appt Notes    In 1 month Weiler, Colleen M, DO [facility] px, last px: 11/08/2024, policy informed

## 2024-12-10 ENCOUNTER — LAB ENCOUNTER (OUTPATIENT)
Dept: LAB | Age: 69
End: 2024-12-10
Attending: FAMILY MEDICINE
Payer: MEDICARE

## 2024-12-10 ENCOUNTER — OFFICE VISIT (OUTPATIENT)
Dept: FAMILY MEDICINE CLINIC | Facility: CLINIC | Age: 69
End: 2024-12-10

## 2024-12-10 VITALS
HEIGHT: 61.22 IN | WEIGHT: 147 LBS | TEMPERATURE: 97 F | HEART RATE: 83 BPM | RESPIRATION RATE: 16 BRPM | SYSTOLIC BLOOD PRESSURE: 144 MMHG | DIASTOLIC BLOOD PRESSURE: 87 MMHG | BODY MASS INDEX: 27.75 KG/M2

## 2024-12-10 DIAGNOSIS — Z00.00 ENCOUNTER FOR SUBSEQUENT ANNUAL WELLNESS VISIT IN MEDICARE PATIENT: ICD-10-CM

## 2024-12-10 DIAGNOSIS — M81.0 AGE-RELATED OSTEOPOROSIS WITHOUT CURRENT PATHOLOGICAL FRACTURE: ICD-10-CM

## 2024-12-10 DIAGNOSIS — Z12.31 ENCOUNTER FOR SCREENING MAMMOGRAM FOR BREAST CANCER: ICD-10-CM

## 2024-12-10 DIAGNOSIS — M19.049 ARTHRITIS OF HAND: ICD-10-CM

## 2024-12-10 DIAGNOSIS — M65.332 TRIGGER MIDDLE FINGER OF LEFT HAND: ICD-10-CM

## 2024-12-10 DIAGNOSIS — M54.50 ACUTE BILATERAL LOW BACK PAIN WITHOUT SCIATICA: ICD-10-CM

## 2024-12-10 DIAGNOSIS — Z12.11 SCREENING FOR COLON CANCER: ICD-10-CM

## 2024-12-10 DIAGNOSIS — Z00.00 ENCOUNTER FOR SUBSEQUENT ANNUAL WELLNESS VISIT IN MEDICARE PATIENT: Primary | ICD-10-CM

## 2024-12-10 DIAGNOSIS — E78.5 HYPERLIPIDEMIA, UNSPECIFIED HYPERLIPIDEMIA TYPE: ICD-10-CM

## 2024-12-10 LAB
ALBUMIN SERPL-MCNC: 4.8 G/DL (ref 3.2–4.8)
ALBUMIN/GLOB SERPL: 1.7 {RATIO} (ref 1–2)
ALP LIVER SERPL-CCNC: 66 U/L
ALT SERPL-CCNC: 21 U/L
ANION GAP SERPL CALC-SCNC: 8 MMOL/L (ref 0–18)
AST SERPL-CCNC: 20 U/L (ref ?–34)
BILIRUB SERPL-MCNC: 0.5 MG/DL (ref 0.2–1.1)
BUN BLD-MCNC: 15 MG/DL (ref 9–23)
BUN/CREAT SERPL: 19.5 (ref 10–20)
CALCIUM BLD-MCNC: 9.6 MG/DL (ref 8.7–10.4)
CHLORIDE SERPL-SCNC: 109 MMOL/L (ref 98–112)
CHOLEST SERPL-MCNC: 140 MG/DL (ref ?–200)
CO2 SERPL-SCNC: 27 MMOL/L (ref 21–32)
CREAT BLD-MCNC: 0.77 MG/DL
DEPRECATED RDW RBC AUTO: 42.6 FL (ref 35.1–46.3)
EGFRCR SERPLBLD CKD-EPI 2021: 83 ML/MIN/1.73M2 (ref 60–?)
ERYTHROCYTE [DISTWIDTH] IN BLOOD BY AUTOMATED COUNT: 12.6 % (ref 11–15)
FASTING PATIENT LIPID ANSWER: YES
FASTING STATUS PATIENT QL REPORTED: YES
GLOBULIN PLAS-MCNC: 2.9 G/DL (ref 2–3.5)
GLUCOSE BLD-MCNC: 72 MG/DL (ref 70–99)
HCT VFR BLD AUTO: 41.6 %
HDLC SERPL-MCNC: 44 MG/DL (ref 40–59)
HGB BLD-MCNC: 13.9 G/DL
LDLC SERPL CALC-MCNC: 76 MG/DL (ref ?–100)
MCH RBC QN AUTO: 30.8 PG (ref 26–34)
MCHC RBC AUTO-ENTMCNC: 33.4 G/DL (ref 31–37)
MCV RBC AUTO: 92 FL
NONHDLC SERPL-MCNC: 96 MG/DL (ref ?–130)
OSMOLALITY SERPL CALC.SUM OF ELEC: 297 MOSM/KG (ref 275–295)
PLATELET # BLD AUTO: 211 10(3)UL (ref 150–450)
POTASSIUM SERPL-SCNC: 3.7 MMOL/L (ref 3.5–5.1)
PROT SERPL-MCNC: 7.7 G/DL (ref 5.7–8.2)
RBC # BLD AUTO: 4.52 X10(6)UL
SODIUM SERPL-SCNC: 144 MMOL/L (ref 136–145)
TRIGL SERPL-MCNC: 112 MG/DL (ref 30–149)
TSI SER-ACNC: 0.89 UIU/ML (ref 0.55–4.78)
VLDLC SERPL CALC-MCNC: 17 MG/DL (ref 0–30)
WBC # BLD AUTO: 5.6 X10(3) UL (ref 4–11)

## 2024-12-10 PROCEDURE — 80053 COMPREHEN METABOLIC PANEL: CPT

## 2024-12-10 PROCEDURE — 84443 ASSAY THYROID STIM HORMONE: CPT

## 2024-12-10 PROCEDURE — 85027 COMPLETE CBC AUTOMATED: CPT

## 2024-12-10 PROCEDURE — 36415 COLL VENOUS BLD VENIPUNCTURE: CPT

## 2024-12-10 PROCEDURE — 80061 LIPID PANEL: CPT

## 2024-12-10 RX ORDER — NAPROXEN 500 MG/1
500 TABLET ORAL 2 TIMES DAILY PRN
Qty: 180 TABLET | Refills: 1 | Status: SHIPPED | OUTPATIENT
Start: 2024-12-10

## 2024-12-10 RX ORDER — ATORVASTATIN CALCIUM 10 MG/1
10 TABLET, FILM COATED ORAL NIGHTLY
Qty: 90 TABLET | Refills: 3 | Status: SHIPPED | OUTPATIENT
Start: 2024-12-10

## 2024-12-10 NOTE — PROGRESS NOTES
Subjective:   Olga Lidia Elliott is a 69 year old female who presents for a MA AHA (Medicare Advantage Annual Health Assessment) and Subsequent Annual Wellness visit (Pt already had Initial Annual Wellness) and scheduled follow up of multiple significant but stable problems.        69 yr old female who presents for Medicare physical. Retired.  Lives with 2 sisters and 10 yr old grand nephew.   Very active.     Having lower left back pain for the past few months. Uses [facility] with relief. Declined PT.     Has trigger finger to left third finger.  Corrects with BenGay and warm water. Finger had been broken in the past. Takes Naproxen BID.     Takes statin nightly.     Declined mammogram.     Mother had colonoscopy.  Had trouble doing the cologuard last year.  Has always had constipation. Goes once per week.     Declined all vaccines.     Had osteoporosis on DEXA scan.  Pt does not wish to take medication at this time. Eats cheese and cottage cheese.     Has macular degeneration.  Wears glasses.     Normal hearing.     No s/s depression    History/Other:   Fall Risk Assessment:   She has been screened for Falls and is low risk.      Cognitive Assessment:   She had a completely normal cognitive assessment - see flowsheet entries     Functional Ability/Status:   Olga Lidia Elliott has some abnormal functions as listed below:  She has Vision problems based on screening of functional status.       Depression Screening (PHQ):  PHQ-2 SCORE: 0  , done 12/10/2024             Advanced Directives:   She does NOT have a Living Will. [Do you have a living will?: No]  She does NOT have a Power of  for Health Care. [Do you have a healthcare power of ?: No]  Discussed Advance Care Planning with patient (and family/surrogate if present). Standard forms made available to patient in After Visit Summary.      Patient Active Problem List   Diagnosis    Hyperlipidemia    Osteoporosis     Allergies:  She has No Known  Allergies.    Current Medications:  Outpatient Medications Marked as Taking for the 12/10/24 encounter (Office Visit) with Weiler, Colleen M, DO   Medication Sig    atorvastatin 10 MG Oral Tab Take 1 tablet (10 mg total) by mouth nightly.    naproxen 500 MG Oral Tab Take 1 tablet (500 mg total) by mouth 2 (two) times daily as needed.    [] Zoster Vac Recomb Adjuvanted 50 MCG/0.5ML Intramuscular Recon Susp Inject 50 mcg into the muscle once for 1 dose. Please administer vaccine at pharmacy       Medical History:  She  has a past medical history of Macular degeneration and Migraines.  Surgical History:  She  has a past surgical history that includes electrocardiogram, complete (2013).   Family History:  Her family history includes Breast Cancer in her maternal aunt; Cancer in her maternal aunt; Colon Cancer (age of onset: 56) in her mother; Diabetes in her mother and sister; Other in her father.  Social History:  She  reports that she has never smoked. She has never been exposed to tobacco smoke. She has never used smokeless tobacco. She reports current alcohol use. She reports that she does not use drugs.    Tobacco:  She has never smoked tobacco.    CAGE Alcohol Screen:   CAGE screening score of 0 on 12/10/2024, showing low risk of alcohol abuse.      Patient Care Team:  Weiler, Colleen M, DO as PCP - General (Family Medicine)    Review of Systems  ROS:   Allergic/Immuno:  Negative for environmental allergies and food allergies  Cardiovascular:  Negative for chest pain and irregular heartbeat/palpitations  Constitutional:  Negative for decreased activity, fever, irritability and lethargy  Endocrine:  Negative for abnormal sleep patterns, increased activity, polydipsia and polyphagia  ENMT:  Negative for ear drainage, hearing loss and nasal drainage  Eyes:  Negative for eye discharge and vision loss  Gastrointestinal:  Negative for abdominal pain, decreased appetite, diarrhea and vomiting. Positive for  constipation  Genitourinary:  Negative for dysuria and hematuria  Hema/Lymph:  Negative for easy bleeding and easy bruising  Integumentary:  Negative for pruritus and rash  Musculoskeletal:  Positive for trigger finger to left third finger  Neurological:  Negative for gait disturbance  Psychiatric:  Negative for inappropriate interaction and psychiatric symptoms      Objective:   Physical Exam         /87   Pulse 83   Temp 97.2 °F (36.2 °C) (Temporal)   Resp 16   Ht 5' 1.22\" (1.555 m)   Wt 147 lb (66.7 kg)   BMI 27.58 kg/m²  Estimated body mass index is 27.58 kg/m² as calculated from the following:    Height as of this encounter: 5' 1.22\" (1.555 m).    Weight as of this encounter: 147 lb (66.7 kg).    Medicare Hearing Assessment:   Hearing Screening    Screening Method: Questionnaire  I have a problem hearing over the telephone: No I have trouble following the conversations when two or more people are talking at the same time: No   I have trouble understanding things on the TV: No I have to strain to understand conversations: No   I have to worry about missing the telephone ring or doorbell: No I have trouble hearing conversations in a noisy background such as a crowded room or restaurant: No   I get confused about where sounds come from: No I misunderstand some words in a sentence and need to ask people to repeat themselves: No   I especially have trouble understanding the speech of women and children: No I have trouble understanding the speaker in a large room such as at a meeting or place of Hindu: No   Many people I talk to seem to mumble (or don't speak clearly): No People get annoyed because I misunderstand what they say: No   I misunderstand what others are saying and make inappropriate responses: No I avoid social activities because I cannot hear well and fear I will reply improperly: No   Family members and friends have told me they think I may have hearing loss: No             Visual Acuity:    Right Eye Visual Acuity: Corrected Right Eye Chart Acuity: 20/30   Left Eye Visual Acuity: Corrected Left Eye Chart Acuity: 20/40   Both Eyes Visual Acuity: Corrected Both Eyes Chart Acuity: 20/30   Able To Tolerate Visual Acuity: Yes      Gen:  Well-nourished.  No distress.  HEENT: Conjunctive clear.  Mando ear canals clear.  Mando TMs intact with good landmarks noted.  Nares patent.  Oral mucous membrane moist.  Normal lips, teeth, and gums.  Oropharynx normal.  Neck supple.  Good ROM.  No LAD.  Thyroid normal.  CV:  Regular rate and rhythm; no murmurs  Lungs:  Clear to ausculation; good aeration               No wheezes, rales or rhonchi  Abd: soft, non-tender, non-distended          Normal bowel sounds; no masses          No hepatosplenomegaly  Breasts:  Normal appearance bilateral.  No masses or lesions noted.  Normal nipples bilateral.  No nipple discharge noted.  Normal lymph nodes.  Extremities: No cyanosis, clubbing, edema.  Pedal pulses 2+ mando.  MSK:  No abnormalities.  Skin: Warm/dry/intact.  No abnormal moles/lesions noted.  No rashes.        Assessment & Plan:   Olga Lidia Elliott is a 69 year old female who presents for a Medicare Assessment.     1. Encounter for subsequent annual wellness visit in Medicare patient (Primary)    Physically active. Labs done. Declined vaccines.   -     CBC, Platelet; No Differential; Future; Expected date: 12/10/2024  -     Comp Metabolic Panel (14); Future; Expected date: 12/10/2024  -     Lipid Panel; Future; Expected date: 12/10/2024  -     Assay, Thyroid Stim Hormone; Future; Expected date: 12/10/2024    2. Arthritis of hand    Advised Voltaren gel    3. Trigger middle finger of left hand    Advised icing and anti-inflammatories.     4. Acute bilateral low back pain without sciatica    Check xray lumbar spine.   -     XR LUMBAR SPINE (MIN 2 VIEWS) (CPT=72100); Future; Expected date: 12/10/2024    5. Screening for colon cancer    Declined colonoscopy.  Fit test ordered.    -     Occult Blood, Fecal, FIT Immunoassay; Future; Expected date: 12/10/2024    6. Age-related osteoporosis without current pathological fracture    Pt does not wish to have treatment.  Will continue with calcium and vitamin D    7. Encounter for screening mammogram for breast cancer     Mammogram ordered.  Will call with results.    -     [facility] DANIEL 2D+3D SCREENING BILAT (CPT=77067/17436); Future; Expected date: 12/10/2024    8. Hyperlipidemia, unspecified hyperlipidemia type    Check lipid panel. On statin.     Other orders  -     High Dose Fluzone trivalent influenza, 65yrs+ PFS (10491)  -     Prevnar 20 (PCV20) [87713]  -     Atorvastatin Calcium; Take 1 tablet (10 mg total) by mouth nightly.  Dispense: 90 tablet; Refill: 3  -     Naproxen; Take 1 tablet (500 mg total) by mouth 2 (two) times daily as needed.  Dispense: 180 tablet; Refill: 1  -     Zoster Vac Recomb Adjuvanted; Inject 50 mcg into the muscle once for 1 dose. Please administer vaccine at pharmacy  Dispense: 1 each; Refill: 1    The patient indicates understanding of these issues and agrees to the plan.  Reinforced healthy diet, lifestyle, and exercise.      Return in 1 year (on 12/10/2025).     Colleen Weiler, DO, 12/21/2024     Supplementary Documentation:   General Health:  In the past six months, have you lost more than 10 pounds without trying?: 2 - No  Has your appetite been poor?: No  Type of Diet: Balanced  How does the patient maintain a good energy level?: Daily Walks  How would you describe your daily physical activity?: Heavy  How do you maintain positive mental well-being?: Social Interaction  On a scale of 0 to 10, with 0 being no pain and 10 being severe pain, what is your pain level?: 5 - (Moderate)  In the past six months, have you experienced urine leakage?: 0-No  At any time do you feel concerned for the safety/well-being of yourself and/or your children, in your home or elsewhere?: No  Have you had any immunizations at another  office such as Influenza, Hepatitis B, Tetanus, or Pneumococcal?: No    Health Maintenance   Topic Date Due    Colorectal Cancer Screening  Never done    Zoster Vaccines (1 of 2) Never done    Pneumococcal Vaccine: 65+ Years (1 of 1 - PCV) Never done    Mammogram  09/30/2020    MA Annual Health Assessment  01/01/2024    COVID-19 Vaccine (4 - 2024-25 season) 09/01/2024    Influenza Vaccine (1) Never done    HTN: BP Follow-Up  01/10/2025    DEXA Scan  Completed    Annual Depression Screening  Completed    Fall Risk Screening (Annual)  Completed

## 2025-01-31 RX ORDER — NAPROXEN 500 MG/1
500 TABLET ORAL 2 TIMES DAILY PRN
Qty: 180 TABLET | Refills: 1 | OUTPATIENT
Start: 2025-01-31

## 2025-04-02 ENCOUNTER — TELEPHONE (OUTPATIENT)
Dept: FAMILY MEDICINE CLINIC | Facility: CLINIC | Age: 70
End: 2025-04-02

## 2025-05-12 ENCOUNTER — TELEPHONE (OUTPATIENT)
Dept: FAMILY MEDICINE CLINIC | Facility: CLINIC | Age: 70
End: 2025-05-12

## 2025-07-10 ENCOUNTER — TELEPHONE (OUTPATIENT)
Dept: FAMILY MEDICINE CLINIC | Facility: CLINIC | Age: 70
End: 2025-07-10

## 2025-08-04 RX ORDER — NAPROXEN 500 MG/1
500 TABLET ORAL 2 TIMES DAILY PRN
Qty: 180 TABLET | Refills: 0 | Status: SHIPPED | OUTPATIENT
Start: 2025-08-04

## (undated) NOTE — LETTER
4/2/2025              Olga Lidia Elliott        [address]        [facility] [place] [address]         Dear Olga Lidia,      Thank you for putting your trust in [facility].  Our goal is to deliver the highest quality healthcare and an exceptional patient experience. Upon reviewing of your medical record shows you are due for the following:       Annual Medicare Physical         Please call [phone], to schedule your appointment or schedule online via [facility] [facility].     If you changed to a new provider at another facility, please notify the clinic to update your records.    If you had any recent testing at another facility, please have your results faxed to our office at [phone].         Thank you and have a great day!      Sincerely,    Colleen Weiler, DO          Document electronically generated by:  Scarlet Mckeon